# Patient Record
(demographics unavailable — no encounter records)

---

## 2024-11-28 NOTE — REASON FOR VISIT
[Follow-Up] : a follow-up visit [TextBox_44] : patient began feeling more sob went to [facility].  has history of lung mosaicism

## 2024-11-28 NOTE — PHYSICAL EXAM
[No Acute Distress] : no acute distress [Normal Oropharynx] : normal oropharynx [Normal Appearance] : normal appearance [No Neck Mass] : no neck mass [Normal Rate/Rhythm] : normal rate/rhythm [Normal S1, S2] : normal s1, s2 [No Murmurs] : no murmurs [No Resp Distress] : no resp distress [No Abnormalities] : no abnormalities [Benign] : benign [Normal Gait] : normal gait [No Clubbing] : no clubbing [No Cyanosis] : no cyanosis [No Edema] : no edema [FROM] : FROM [Normal Color/ Pigmentation] : normal color/ pigmentation [No Focal Deficits] : no focal deficits [Oriented x3] : oriented x3 [Normal Affect] : normal affect [TextBox_68] : ?? tahira blair

## 2024-11-28 NOTE — HISTORY OF PRESENT ILLNESS
~~~~~~~~~~~~~~~~~~~~~~~~~~~~~~~~~~~~~~~  ~ It was a delight to help you today. Thank you for choosing [facility]. I hope you will be feeling better soon! Thank you, Paloma KENDALL  ~~~~~~~~~~~~~~~~~~~~~~~~~~~~~~~~~~~~~~~~    If your provider has ordered additional testing as part of your ongoing plan of care, please allow 5-7 business days (from the day of your visit) for the testing to be resulted and reviewed by your provider. You will be contacted via telephone if your results are abnormal or changes need to be made with your current plan. If you have any questions regarding your testing, please contact the nurse at   [phone].    HOURS:   Sunday: 8:00 am - 12:00 pm  Monday - Friday: 8:00 am - 8:00 pm  Saturday: 8:00 am - 12:00 pm  Holidays: 8:00 am - 4:00 pm    You may be receiving a survey in the mail following your visit. Please take the time to complete this, as your feedback is very important to us! We strive to make your experience exceptional and your comments help us with that goal. We look forward to hearing from you!   mail following your visit. Please take the time to complete this, as your feedback is very important to us! We strive to make your experience exceptional and your comments help us with that goal. We look forward to hearing from you!    Patient Education     Soft Tissue Contusion  You have a contusion. This is also called a bruise. There is swelling and some bleeding under the skin. This injury generally takes a few days to a few weeks to heal.  During that time, the bruise will typically change in color from reddish, to purple-blue, to greenish-yellow, then to yellow-brown.  Home care  · Elevate the injured area to reduce pain and swelling. As much as possible, sit or lie down with the injured area raised about the level of your heart. This is especially important during the first 48 hours.  · Ice the injured area to help reduce pain and swelling. Wrap a cold  [TextBox_4] : seen in the past with dyspnea, had normal pfts and then began to feel better went to er for dyspnea and ct showed mosacism but pfts apparently were again normal she is overweight and out of shape and I actually don't know if her dyspnea is functional or not she is also haveing severe back pain from an orthopedic issues and i think this may be causing her to feel sob. and not her lngs   source (ice pack or ice cubes in a plastic bag) in a thin towel. Apply to the bruised area for 20 minutes every 1 to 2 hours the first day. Continue this 3 to 4 times a day until the pain and swelling goes away.  · Unless another medication was prescribed, you can take acetaminophen, ibuprofen, or naproxen to control pain. (If you have chronic liver or kidney disease or ever had a stomach ulcer or GI bleeding, talk with your doctor before using these medicines.)  Follow up  Follow up with your health care provider or our staff as advised. Call if you are not better in 1 to 2 weeks.  When to seek medical advice   Call your health care provider right away if you have any of the following:  · Increased pain or swelling  · Bruise is on an arm or leg and arm or leg becomes cold, blue, numb or tingly  · Signs of infection: Warmth, drainage, or increased redness or pain around the contusion  · Inability to move the injured area or body part   · Bruise is near your eye and you have problems with your eyesight or eye   · Frequent bruising for unknown reasons  © 4209-6126 [employer]. [address] [place] [id number]. All rights reserved. This information is not intended as a substitute for professional medical care. Always follow your healthcare professional's instructions.

## 2024-11-28 NOTE — REVIEW OF SYSTEMS
[Cough] : no cough [Sputum] : no sputum [Dyspnea] : dyspnea [Negative] : Psychiatric [TextBox_94] : arthrutis, back pain?